# Patient Record
Sex: FEMALE | Race: WHITE
[De-identification: names, ages, dates, MRNs, and addresses within clinical notes are randomized per-mention and may not be internally consistent; named-entity substitution may affect disease eponyms.]

---

## 2017-09-25 ENCOUNTER — HOSPITAL ENCOUNTER (EMERGENCY)
Dept: HOSPITAL 1 - ED | Age: 57
Discharge: HOME | End: 2017-09-25
Payer: MEDICAID

## 2017-09-25 VITALS — DIASTOLIC BLOOD PRESSURE: 77 MMHG | SYSTOLIC BLOOD PRESSURE: 158 MMHG

## 2017-09-25 DIAGNOSIS — Z79.82: ICD-10-CM

## 2017-09-25 DIAGNOSIS — M75.92: Primary | ICD-10-CM

## 2017-09-25 DIAGNOSIS — E78.00: ICD-10-CM

## 2017-09-25 DIAGNOSIS — Z79.1: ICD-10-CM

## 2017-12-20 ENCOUNTER — HOSPITAL ENCOUNTER (EMERGENCY)
Dept: HOSPITAL 1 - ED | Age: 57
LOS: 1 days | Discharge: HOME | End: 2017-12-21
Payer: MEDICAID

## 2017-12-20 VITALS — BODY MASS INDEX: 35.51 KG/M2 | HEIGHT: 62 IN | WEIGHT: 192.99 LBS

## 2017-12-20 DIAGNOSIS — E78.00: ICD-10-CM

## 2017-12-20 DIAGNOSIS — E03.9: ICD-10-CM

## 2017-12-20 DIAGNOSIS — K80.20: Primary | ICD-10-CM

## 2017-12-21 VITALS — DIASTOLIC BLOOD PRESSURE: 75 MMHG | SYSTOLIC BLOOD PRESSURE: 123 MMHG

## 2017-12-21 LAB
ALBUMIN SERPL-MCNC: 3.2 G/DL (ref 3.4–5)
ALP SERPL-CCNC: 87 U/L (ref 46–116)
ALT SERPL-CCNC: 20 U/L (ref 14–59)
AST SERPL-CCNC: 14 U/L (ref 15–37)
BASOPHILS NFR BLD: 0.9 % (ref 0–2)
BILIRUB SERPL-MCNC: 0.17 MG/DL (ref 0.2–1)
BUN SERPL-MCNC: 14 MG/DL (ref 7–18)
CALCIUM SERPL-MCNC: 8.9 MG/DL (ref 8.5–10.1)
CHLORIDE SERPL-SCNC: 103 MMOL/L (ref 98–107)
CO2 SERPL-SCNC: 30.4 MMOL/L (ref 21–32)
CREAT SERPL-MCNC: 0.7 MG/DL (ref 0.6–1)
ERYTHROCYTE [DISTWIDTH] IN BLOOD BY AUTOMATED COUNT: 12.9 % (ref 11.5–14.5)
GFR SERPLBLD BASED ON 1.73 SQ M-ARVRAT: > 60 ML/MIN
GLUCOSE SERPL-MCNC: 121 MG/DL (ref 74–106)
LIPASE SERPL-CCNC: 219 IU/L (ref 73–393)
MICROSCOPIC UR-IMP: YES
PLATELET # BLD: 311 X10^3MCL (ref 130–400)
POTASSIUM SERPL-SCNC: 3.6 MMOL/L (ref 3.5–5.1)
PROT SERPL-MCNC: 7.8 G/DL (ref 6.4–8.2)
RBC # UR STRIP.AUTO: (no result) /UL
SODIUM SERPL-SCNC: 139 MMOL/L (ref 136–145)
T4 FREE SERPL-MCNC: 1.04 NG/DL (ref 0.76–1.46)
UA SPECIFIC GRAVITY: 1.01 (ref 1–1.03)

## 2018-11-30 ENCOUNTER — HOSPITAL ENCOUNTER (EMERGENCY)
Dept: HOSPITAL 1 - ED | Age: 58
Discharge: HOME | End: 2018-11-30
Payer: MEDICAID

## 2018-11-30 VITALS — SYSTOLIC BLOOD PRESSURE: 143 MMHG | DIASTOLIC BLOOD PRESSURE: 67 MMHG

## 2018-11-30 VITALS — HEIGHT: 63 IN | WEIGHT: 182 LBS | BODY MASS INDEX: 32.25 KG/M2

## 2018-11-30 DIAGNOSIS — F41.9: ICD-10-CM

## 2018-11-30 DIAGNOSIS — R51: Primary | ICD-10-CM

## 2018-11-30 DIAGNOSIS — I10: ICD-10-CM

## 2018-11-30 DIAGNOSIS — E78.00: ICD-10-CM

## 2018-11-30 DIAGNOSIS — R42: ICD-10-CM

## 2018-11-30 DIAGNOSIS — R11.0: ICD-10-CM

## 2018-11-30 LAB
BASOPHILS NFR BLD: 0.4 % (ref 0–2)
BUN SERPL-MCNC: 9 MG/DL (ref 7–18)
CALCIUM SERPL-MCNC: 8.9 MG/DL (ref 8.5–10.1)
CHLORIDE SERPL-SCNC: 103 MMOL/L (ref 98–107)
CO2 SERPL-SCNC: 26.5 MMOL/L (ref 21–32)
CREAT SERPL-MCNC: 0.7 MG/DL (ref 0.6–1)
ERYTHROCYTE [DISTWIDTH] IN BLOOD BY AUTOMATED COUNT: 14.7 % (ref 11.5–14.5)
GFR SERPLBLD BASED ON 1.73 SQ M-ARVRAT: > 60 ML/MIN
GLUCOSE SERPL-MCNC: 94 MG/DL (ref 74–106)
PLATELET # BLD: 314 X10^3MCL (ref 130–400)
POTASSIUM SERPL-SCNC: 3.8 MMOL/L (ref 3.5–5.1)
SODIUM SERPL-SCNC: 138 MMOL/L (ref 136–145)

## 2019-02-19 ENCOUNTER — HOSPITAL ENCOUNTER (INPATIENT)
Dept: HOSPITAL 1 - ED | Age: 59
LOS: 3 days | Discharge: HOME | DRG: 263 | End: 2019-02-22
Attending: HOSPITALIST | Admitting: HOSPITALIST
Payer: MEDICAID

## 2019-02-19 VITALS — WEIGHT: 185 LBS | BODY MASS INDEX: 34.04 KG/M2 | HEIGHT: 62 IN

## 2019-02-19 DIAGNOSIS — E03.9: ICD-10-CM

## 2019-02-19 DIAGNOSIS — Z87.891: ICD-10-CM

## 2019-02-19 DIAGNOSIS — E78.5: ICD-10-CM

## 2019-02-19 DIAGNOSIS — R31.9: ICD-10-CM

## 2019-02-19 DIAGNOSIS — Z79.82: ICD-10-CM

## 2019-02-19 DIAGNOSIS — K80.00: Primary | ICD-10-CM

## 2019-02-19 LAB
ALBUMIN SERPL-MCNC: 3.5 G/DL (ref 3.4–5)
ALP SERPL-CCNC: 90 U/L (ref 46–116)
ALT SERPL-CCNC: 21 U/L (ref 14–59)
AST SERPL-CCNC: 17 U/L (ref 15–37)
BASOPHILS NFR BLD: 0.2 % (ref 0–2)
BILIRUB SERPL-MCNC: 0.1 MG/DL (ref 0.2–1)
BUN SERPL-MCNC: 15 MG/DL (ref 7–18)
CALCIUM SERPL-MCNC: 8.8 MG/DL (ref 8.5–10.1)
CHLORIDE SERPL-SCNC: 103 MMOL/L (ref 98–107)
CO2 SERPL-SCNC: 30.3 MMOL/L (ref 21–32)
CREAT SERPL-MCNC: 1 MG/DL (ref 0.6–1)
ERYTHROCYTE [DISTWIDTH] IN BLOOD BY AUTOMATED COUNT: 14.4 % (ref 11.5–14.5)
GFR SERPLBLD BASED ON 1.73 SQ M-ARVRAT: > 60 ML/MIN
GLUCOSE SERPL-MCNC: 107 MG/DL (ref 74–106)
LIPASE SERPL-CCNC: 216 IU/L (ref 73–393)
PLATELET # BLD: 293 X10^3MCL (ref 130–400)
POTASSIUM SERPL-SCNC: 3.6 MMOL/L (ref 3.5–5.1)
PROT SERPL-MCNC: 8.2 G/DL (ref 6.4–8.2)
SODIUM SERPL-SCNC: 141 MMOL/L (ref 136–145)

## 2019-02-19 PROCEDURE — C9113 INJ PANTOPRAZOLE SODIUM, VIA: HCPCS

## 2019-02-20 VITALS — DIASTOLIC BLOOD PRESSURE: 80 MMHG | SYSTOLIC BLOOD PRESSURE: 159 MMHG

## 2019-02-20 VITALS — SYSTOLIC BLOOD PRESSURE: 149 MMHG | DIASTOLIC BLOOD PRESSURE: 76 MMHG

## 2019-02-20 VITALS — SYSTOLIC BLOOD PRESSURE: 151 MMHG | DIASTOLIC BLOOD PRESSURE: 79 MMHG

## 2019-02-20 VITALS — DIASTOLIC BLOOD PRESSURE: 78 MMHG | SYSTOLIC BLOOD PRESSURE: 149 MMHG

## 2019-02-20 VITALS — DIASTOLIC BLOOD PRESSURE: 70 MMHG | SYSTOLIC BLOOD PRESSURE: 128 MMHG

## 2019-02-20 LAB
AMPHETAMINES UR QL SCN: (no result)
BASOPHILS NFR BLD: 0.2 % (ref 0–2)
BUN SERPL-MCNC: 10 MG/DL (ref 7–18)
CALCIUM SERPL-MCNC: 8.3 MG/DL (ref 8.5–10.1)
CHLORIDE SERPL-SCNC: 104 MMOL/L (ref 98–107)
CHOLEST SERPL-MCNC: 200 MG/DL (ref ?–200)
CHOLEST/HDLC SERPL: 4.5 MG/DL
CO2 SERPL-SCNC: 25.7 MMOL/L (ref 21–32)
CREAT SERPL-MCNC: 0.7 MG/DL (ref 0.6–1)
ERYTHROCYTE [DISTWIDTH] IN BLOOD BY AUTOMATED COUNT: 13.9 % (ref 11.5–14.5)
GFR SERPLBLD BASED ON 1.73 SQ M-ARVRAT: > 60 ML/MIN
GLUCOSE SERPL-MCNC: 126 MG/DL (ref 74–106)
HDLC SERPL-MCNC: 44 MG/DL (ref 40–60)
MAGNESIUM SERPL-MCNC: 2.3 MG/DL (ref 1.8–2.4)
MICROSCOPIC UR-IMP: YES
PHOSPHATE SERPL-MCNC: 3.5 MG/DL (ref 2.5–4.9)
PLATELET # BLD: 277 X10^3MCL (ref 130–400)
POTASSIUM SERPL-SCNC: 4 MMOL/L (ref 3.5–5.1)
RBC # UR STRIP.AUTO: (no result) /UL
SODIUM SERPL-SCNC: 139 MMOL/L (ref 136–145)
T3 SERPL-MCNC: 1.19 NG/ML
T3RU NFR SERPL: 34 % UPTAKE (ref 30–39)
T4 FREE SERPL-MCNC: 1.07 NG/DL (ref 0.76–1.46)
T4 SERPL-MCNC: 8.1 UG/DL (ref 4.7–13.3)
T4/T3 UPTAKE INDEX SERPL: 2.8 UG/DL (ref 1.4–4.5)
TRIGL SERPL-MCNC: 106 MG/DL (ref ?–150)
UA SPECIFIC GRAVITY: >=1.03 (ref 1–1.03)

## 2019-02-20 NOTE — NUR
PT VOMIT X1, SMALL AMT IN TRASH CAN. PT STATES FEELING RELIEF. DENIES ANY
NAUSEA AT THIS TIME. EMESIS BAG PROVIDED. INSTRUCTED TO CALL FOR ANY FURTHER
NAUSEA SYMPTOMS.

## 2019-02-20 NOTE — NUR
PT TRANSFERRED FROM ER VIA [facility] ACCOMPANIED BY RN AND EMT. PT IS A/O X4,
SPEECH CLEAR AND APPROPRIATE. PERRLA NOTED. PT WITH C/O 8/10 EPIGASTRIC PAIN.
CARDIAC MONITOR IN PLACE, TELE MONITOR #37 SH[address] NO SOB NOTED, RESPS E/U
ON ROOM AIR. LUNG SOUNDS CTA. CHEST RISE EQUAL AND SYMMETRICAL. ABD ROUNDED,
SOFT, NON-DISTENDED. C/O MID TO RIGHT SIDED ABD PAIN UPON PALPATION. BOWEL
SOUNDS HYPOACTIVE. DENIES ANY N/V/D. SKIN WARM DRY TO TOUCH. IV TO LFA G 22
INTACT AND PATENT. NS INFUSING @ 100ML/HR. PT VOIDS FREELY WITH BRP.
AMBULATORY WITH STEADY GAIT. INSTRUCTED TO CALL FOR ASSISTANCE. CALL LIGHT
WITHIN REACH. WILL CONTINUE TO MONITOR.

## 2019-02-20 NOTE — NUR
PT RESTING IN BED, FAMILY AT BEDSIDE. AOX4, DENIES HA/DIZZINESS. TELE #37, ST
DENIES CP. PULSES PALPABLE BILAT, DENIES NUMBNESS/TINGLING IN FEET. RESP EVEN
AND UNLABORED ON RA, DENIES SOB. ABD SOFT, ROUND, REPORTS PAIN W/ PALPATION
ONLY TO RUQ. PT ABLE TO EAT SMALL PORTION OF DINNER (FRUIT CUP AND BREAD) AND
PT UNDERSTANDS SHE WILL BE NPO AFTER MIDNIGHT FOR LAP LUISA TOMORROW 2/21/19
@0930. WILL OBTAIN CONSENT. DENIES N/V/D AT THIS TIME. PT AMB DENIES DIZZINESS
UPON STANDING. SKIN INTACT. IV SITE TO LFA NS @100ML/HR. NO REDNESS, SWELLING
OR PAIN NOTED. ALL COMFORT AND SAFETY MEASURES PROVIDED FOR, CALL LIGHT
WITHIN REACH, BED IN LOWEST POSITION, WILL CONTINUE TO MONITOR.

## 2019-02-20 NOTE — NUR
PT MEDICATED WITH MORPHINE IVP AND MYLANTA FOR C/O 8/10 EPIGASTRIC PAIN AND
HEARTBURN. WILL REASSESS PER PROTOCOL.

## 2019-02-20 NOTE — NUR
PT WITH C/O HEARTBURN REQUESTING MEDICATION. DR HERNANDEZ CALLED AND MADE
AWARE, ORDER GIVEN FOR MYLANTA PRN.

## 2019-02-21 VITALS — DIASTOLIC BLOOD PRESSURE: 65 MMHG | SYSTOLIC BLOOD PRESSURE: 109 MMHG

## 2019-02-21 VITALS — SYSTOLIC BLOOD PRESSURE: 95 MMHG | DIASTOLIC BLOOD PRESSURE: 57 MMHG

## 2019-02-21 VITALS — DIASTOLIC BLOOD PRESSURE: 70 MMHG | SYSTOLIC BLOOD PRESSURE: 127 MMHG

## 2019-02-21 VITALS — DIASTOLIC BLOOD PRESSURE: 59 MMHG | SYSTOLIC BLOOD PRESSURE: 125 MMHG

## 2019-02-21 VITALS — SYSTOLIC BLOOD PRESSURE: 92 MMHG | DIASTOLIC BLOOD PRESSURE: 47 MMHG

## 2019-02-21 VITALS — DIASTOLIC BLOOD PRESSURE: 62 MMHG | SYSTOLIC BLOOD PRESSURE: 136 MMHG

## 2019-02-21 LAB
BASOPHILS NFR BLD: 0.3 % (ref 0–2)
BUN SERPL-MCNC: 8 MG/DL (ref 7–18)
CALCIUM SERPL-MCNC: 8.9 MG/DL (ref 8.5–10.1)
CHLORIDE SERPL-SCNC: 105 MMOL/L (ref 98–107)
CO2 SERPL-SCNC: 27.4 MMOL/L (ref 21–32)
CREAT SERPL-MCNC: 0.7 MG/DL (ref 0.6–1)
ERYTHROCYTE [DISTWIDTH] IN BLOOD BY AUTOMATED COUNT: 14.3 % (ref 11.5–14.5)
GFR SERPLBLD BASED ON 1.73 SQ M-ARVRAT: > 60 ML/MIN
GLUCOSE SERPL-MCNC: 104 MG/DL (ref 74–106)
MAGNESIUM SERPL-MCNC: 2.3 MG/DL (ref 1.8–2.4)
PHOSPHATE SERPL-MCNC: 3.4 MG/DL (ref 2.5–4.9)
PLATELET # BLD: 291 X10^3MCL (ref 130–400)
POTASSIUM SERPL-SCNC: 3.6 MMOL/L (ref 3.5–5.1)
SODIUM SERPL-SCNC: 137 MMOL/L (ref 136–145)

## 2019-02-21 PROCEDURE — 0FT44ZZ RESECTION OF GALLBLADDER, PERCUTANEOUS ENDOSCOPIC APPROACH: ICD-10-PCS | Performed by: SURGERY

## 2019-02-21 NOTE — NUR
RECEIVED PATIENT BACK FROM OR S/P LAP LUISA, MONITOR TECH MADE AWARE. PATIENT
A/O X4, CLEAR SPEECH, ABLE TO MAKE NEEDS KNOWN AND FOLLOW COMMANDS, ABLE TO
TRANSFER SELF FROM GURNEY TO BED. PATIENT PLACED ON O2 4 L/MIN VIA NC,
BREATHING EVEN UNLABBORED. PATIENT HAS X5 INCISION TO ABD, X4 CLOSED WITH
SUTURES AND DERMABOND, X1 CLOSED WITH STAPLES AND DERMABOND, CDI KATERYNA. PATIENT
DENIES ANY PAIN. PATIENT MADE COMFORTABLE, ALL NEEDS ATTENDED TO, SAFETY
PRECAUTIONS IN PLACE. FAMILY AT BEDSIDE.
VS: 95/57, MAP 71, HR 83, TEMP 98.3, O2 SAT 97%.

## 2019-02-21 NOTE — NUR
RECEIVED PATIENT SITTING UP IN BED A/O X4, CLEAR SPEECH, NO NEURO DEFICITS
NOTED. TELE # 37 IN PLACE, DENIES CHEST PAIN. BREATHING EVEN UNLABBORED ON RA,
DENIES SOB, NO DISTRESS NOTED. DENIES ANY PAIN. NPO STATUS MAINTAINED FOR
PROCEDURE THIS AM. IV TO LFA INTACT AND PATENT FLUSHED WELL WITH 10 ML NS.
PATIENT IS CALM WITH CARE. INSTRUCTED TO CALL FOR ASSISTANCE IF NEEDED.
SAFETY PRECAUTIONS IN PLACE. WILL MONITOR.

## 2019-02-21 NOTE — NUR
PROVIDED PT WIPES TO PERFORM SELF LEON WIPE DOWN. PT TOLERATED WELL. DENIES
PAIN AT THIS TIME. WILL ENDORSED CHECKLIST.

## 2019-02-21 NOTE — NUR
PATIENT SITTING UP IN BED EATING DINNER, TOLERATING WELL NO DISTRESS NOTED.
FAMILY AT BEDSIDE. IV TO LFA INTACT INFUSING IVF WELL FREE FROM REDNESS AND
INFILTRATION. INCISION X5 TO ABD CDI. PATIENT STATES SHE IS BURPING, BUT
DENIES PASSING GAS, NO BM YET. ALL NEEDS ATTENDED TO DURING SHIFT. SAFETY
PRECAUTIONS IN PLACE. WILL ENDORSE CARE TO ONCOMING NURSE.

## 2019-02-21 NOTE — NUR
PT RESTING IN BED, FAMILY AT BEDSIDE. PT S/P LAP LUISA (2/21/19) WITH 5 ABD
LAP INCISIONS (CLOSED WITH SUTURES AND DERMABOND X4, CLOSED WITH STAPLE AND
DERMABOND X1. INCISIONS APPEAR WELL APPROXIMATED, NO DRAIANGE NOTED.
HYPOACTIVE SOUND SOUNDS, PT REPORT BURPING BUT NOT YET PASSING GAS. PT HAS NO
BM YET. PT HAS BEEN UP WITH PT SINCE SX, TOLERATING WELL. ENCOURAGED PT TO
AMBULATE MORE DURING SHIFT/ PT VERBALIZES UNDERSTANDING. TELE #37, ,
DENIES CP. RESP EVEN AND UNLABORED ON 2LNC, DENIES SOB. IV SITE TO LFA D5 1/2
NS AT 80ML/HR. NO REDNESS, SWELLING OR PAIN NOTED. ALL COMFORT AND SAFETY
MEASURES PROVIDED FOR, CALL LIGHT WITHIN REACH,BED IN LOWEST POSITION, WILL
CONTINUE TO MONITOR.

## 2019-02-21 NOTE — NUR
PT RESTED IN INTERVALS DURING SHIFT, NO ACUTE CHANGES OCCURRING OVERNIGHT. PT
DENIES ABD PAIN DURING SHIFT, DENIES N/V/D. PT SIGNED CONSENT FOR LAP LUISA W/
POSS OPEN. IV SITE REMAINS PATENT TO LFA, NS @ 100ML/HR. NO REDNESS, SWELLING
OR PAIN NOTED. PT HAS BEEN NPO SINCE MIDNIGHT, ALL COMFORT AND SAFETY MEASURES
PROVIDED FOR, CALL LIGHT WITHIN REACH, BED IN LOWEST POSITION, WILL CONTINUE
TO MONITOR.

## 2019-02-21 NOTE — NUR
ALL CARE ENDORSED TO DAYSHIFT NURSE, NO ACUTE DISTRESS NOTED. ALL QUESTIONS
AND CONCERNS ADDRESSED, CALL LIGHT WITHIN REACH,  BED IN LOWEST POSITION.

## 2019-02-22 VITALS — DIASTOLIC BLOOD PRESSURE: 54 MMHG | SYSTOLIC BLOOD PRESSURE: 114 MMHG

## 2019-02-22 VITALS — DIASTOLIC BLOOD PRESSURE: 56 MMHG | SYSTOLIC BLOOD PRESSURE: 98 MMHG

## 2019-02-22 VITALS — SYSTOLIC BLOOD PRESSURE: 114 MMHG | DIASTOLIC BLOOD PRESSURE: 54 MMHG

## 2019-02-22 LAB
BASOPHILS NFR BLD: 0.1 % (ref 0–2)
BUN SERPL-MCNC: 10 MG/DL (ref 7–18)
CALCIUM SERPL-MCNC: 8.5 MG/DL (ref 8.5–10.1)
CHLORIDE SERPL-SCNC: 104 MMOL/L (ref 98–107)
CO2 SERPL-SCNC: 29.5 MMOL/L (ref 21–32)
CREAT SERPL-MCNC: 0.8 MG/DL (ref 0.6–1)
ERYTHROCYTE [DISTWIDTH] IN BLOOD BY AUTOMATED COUNT: 14.3 % (ref 11.5–14.5)
GFR SERPLBLD BASED ON 1.73 SQ M-ARVRAT: > 60 ML/MIN
GLUCOSE SERPL-MCNC: 122 MG/DL (ref 74–106)
PLATELET # BLD: 249 X10^3MCL (ref 130–400)
POTASSIUM SERPL-SCNC: 3.9 MMOL/L (ref 3.5–5.1)
SODIUM SERPL-SCNC: 139 MMOL/L (ref 136–145)

## 2019-02-22 NOTE — NUR
UPON ASSESSMENT ON PT, PT REPORTS PAIN UNDER CONTROL AT THIS TIME. PT REPORTS
AMBULATING TO RESTROOM TO URINATE. REPORTS NO PAIN WHEN GETTING UP. ATTEMPTED
TO GET PT OFF 2LNC, PT O2 SAT ON RA= 90-93%. RR= 22. RETURNED PT BACK TO NASAL
[facility]ULA AND ASKED PT TO DEMONSTRATE THE USE OF THE INCENTIVE SPIROMETER. PT
ABLE TO REACH 1000ML, WILL ENCOURAGE IT'S USE 10X HOUR WHILE AWAKE TO PROMOTE
GAS EXCHANGE AND FULLY LUNG EXPANSION. PT VERBALIZES UNDERSTANDING, ALL
COMFORT AND SAFETY MEASURES PROVIDED FOR, CALL LIGHT WITHIN REACH, BED IN
LOWEST POSITION, WILL CONTINUE TO MONITOR.

## 2019-02-22 NOTE — NUR
REPORT RECEIVED KRISTIE EVANGELISTA. PATIENT IN BED RESTING, BED IS TO THE LOWEST
POSITION. PATIENT IS ALERT AND ORIENTED, ABLE TO FOLLOW COMMANDS, AND
PLEASANT. PATIENT IS CURRENTLY EATING AND NO TROUBLE SWALLOWING.
PATIENT IS BREATHING ADEQUATELY WITH NO SIGNS OF DISTRESS.
PATIENTS ABDOMEN INCISION IS DRY AND INTACT AND NO SIGNS OF DRAINAGE.IT IS
CLOSED WITH SUTURES AND DERMABAND AND CLOSED. WILL CONTINUE TO MONITOR.

## 2019-02-22 NOTE — NUR
TELE RETURNED TO MONITOR TECH AT THIS TIME. PT WHEELED OFF UNIT WITH CNA AND
DAUGHTER. NO INCIDENT OCCURED.

## 2019-02-22 NOTE — NUR
DISCHARGE INSTRUCTIONS PROVIDED TO PATIENT AT THIS TIME. ALL BELONGINGS
RETURNED TO PATIENT, GLASSES, DENTURES, EARRINGS, PHONE,  ETC. DAUGHTER
AT BEDSIDE AND WILL BE TAKING PATIENT HOME. IV DC'D AT THIS TIME CATH INTACT,
DRESSING IN PLACE. WILL WHEEL PATIENT OUT TO CAR.

## 2019-02-22 NOTE — NUR
PT RESTED IN INTERVALS DURING SHIFT, NO ACUTE CHNAGES OCCURRING OVERNIGHT. PT
DENIES N/V/D DURING SHIFT. PT REPORTS PASSING GAS YET NO BM YET. ENCOURAGED
AMBULATION DURING SHIFT, YET PT REPORTS SHE WANTS TO WALK DURING THE DAYTIME
AND GET REST AT NIGHT. EDUCATED PT IN REGARDS TO BENEFITS OF EARLY
AMBULATION. PT HAS INCENTIVE SPIROMETER AT BEDSIDE. ABD INCISIONS REMAINS FREE
OF DRAINAGE, AND ARE WELL APPROXIMATED. PT MEDICATED X1 WITH NORCO FOR ABD
PAIN AND PT RESTED WELL AFTER ADMINISTERED. NEW IV INSERTED TO LFA 20G, D5 1/2
NS @ 80ML/HR, NO REDNESS, SWELLING OR PAIN NOTED. ALL COMFORT AND SAFETY
MEASURES PROVIDED FOR, CALL LIGHT WITHIN REACH, BED IN LOWEST POSITION, WILL
CONTINUE TO MONITOR.

## 2019-03-12 ENCOUNTER — HOSPITAL ENCOUNTER (EMERGENCY)
Dept: HOSPITAL 1 - ED | Age: 59
Discharge: HOME | End: 2019-03-12
Payer: MEDICAID

## 2019-03-12 VITALS — DIASTOLIC BLOOD PRESSURE: 73 MMHG | SYSTOLIC BLOOD PRESSURE: 143 MMHG

## 2019-03-12 VITALS — WEIGHT: 181.13 LBS | BODY MASS INDEX: 33.33 KG/M2 | HEIGHT: 62 IN

## 2019-03-12 DIAGNOSIS — E78.00: ICD-10-CM

## 2019-03-12 DIAGNOSIS — M26.602: Primary | ICD-10-CM

## 2019-03-12 DIAGNOSIS — E03.9: ICD-10-CM

## 2019-03-12 DIAGNOSIS — F41.9: ICD-10-CM

## 2019-03-12 DIAGNOSIS — Z90.49: ICD-10-CM

## 2019-10-26 ENCOUNTER — HOSPITAL ENCOUNTER (INPATIENT)
Dept: HOSPITAL 1 - ED | Age: 59
LOS: 1 days | Discharge: HOME | DRG: 203 | End: 2019-10-27
Attending: FAMILY MEDICINE | Admitting: FAMILY MEDICINE
Payer: MEDICAID

## 2019-10-26 VITALS
BODY MASS INDEX: 30.73 KG/M2 | HEIGHT: 62 IN | HEIGHT: 62 IN | WEIGHT: 167 LBS | WEIGHT: 167 LBS | BODY MASS INDEX: 30.73 KG/M2

## 2019-10-26 VITALS — SYSTOLIC BLOOD PRESSURE: 107 MMHG | DIASTOLIC BLOOD PRESSURE: 63 MMHG

## 2019-10-26 VITALS — SYSTOLIC BLOOD PRESSURE: 141 MMHG | DIASTOLIC BLOOD PRESSURE: 84 MMHG

## 2019-10-26 DIAGNOSIS — R07.89: Primary | ICD-10-CM

## 2019-10-26 DIAGNOSIS — E78.5: ICD-10-CM

## 2019-10-26 DIAGNOSIS — I10: ICD-10-CM

## 2019-10-26 DIAGNOSIS — E03.9: ICD-10-CM

## 2019-10-26 LAB
ALBUMIN SERPL-MCNC: 3.5 G/DL (ref 3.4–5)
ALP SERPL-CCNC: 89 U/L (ref 46–116)
ALT SERPL-CCNC: 19 U/L (ref 14–59)
AST SERPL-CCNC: 25 U/L (ref 15–37)
BASOPHILS NFR BLD: 0.3 % (ref 0–2)
BILIRUB SERPL-MCNC: 0.39 MG/DL (ref 0.2–1)
BUN SERPL-MCNC: 7 MG/DL (ref 7–18)
CALCIUM SERPL-MCNC: 8.4 MG/DL (ref 8.5–10.1)
CHLORIDE SERPL-SCNC: 104 MMOL/L (ref 98–107)
CHOLEST SERPL-MCNC: 166 MG/DL (ref ?–200)
CHOLEST/HDLC SERPL: 3.1 MG/DL
CO2 SERPL-SCNC: 27.5 MMOL/L (ref 21–32)
CREAT SERPL-MCNC: 0.5 MG/DL (ref 0.6–1)
ERYTHROCYTE [DISTWIDTH] IN BLOOD BY AUTOMATED COUNT: 15 % (ref 11.5–14.5)
GFR SERPLBLD BASED ON 1.73 SQ M-ARVRAT: > 60 ML/MIN
GLUCOSE SERPL-MCNC: 97 MG/DL (ref 74–106)
HDLC SERPL-MCNC: 54 MG/DL (ref 40–60)
PLATELET # BLD: 265 X10^3MCL (ref 130–400)
POTASSIUM SERPL-SCNC: 3.4 MMOL/L (ref 3.5–5.1)
PROT SERPL-MCNC: 8 G/DL (ref 6.4–8.2)
SODIUM SERPL-SCNC: 140 MMOL/L (ref 136–145)
T3 SERPL-MCNC: 1.18 NG/ML
T3RU NFR SERPL: 34 % UPTAKE (ref 30–39)
T4 FREE SERPL-MCNC: 1.17 NG/DL (ref 0.76–1.46)
T4 SERPL-MCNC: 11 UG/DL (ref 4.7–13.3)
T4/T3 UPTAKE INDEX SERPL: 3.7 UG/DL (ref 1.4–4.5)
TRIGL SERPL-MCNC: 109 MG/DL (ref ?–150)

## 2019-10-26 PROCEDURE — G0378 HOSPITAL OBSERVATION PER HR: HCPCS

## 2019-10-27 VITALS — SYSTOLIC BLOOD PRESSURE: 111 MMHG | DIASTOLIC BLOOD PRESSURE: 61 MMHG

## 2019-10-27 VITALS — SYSTOLIC BLOOD PRESSURE: 104 MMHG | DIASTOLIC BLOOD PRESSURE: 56 MMHG

## 2019-10-27 VITALS — SYSTOLIC BLOOD PRESSURE: 138 MMHG | DIASTOLIC BLOOD PRESSURE: 76 MMHG

## 2019-10-27 VITALS — SYSTOLIC BLOOD PRESSURE: 95 MMHG | DIASTOLIC BLOOD PRESSURE: 55 MMHG

## 2019-10-27 LAB
BASOPHILS NFR BLD: 0.5 % (ref 0–2)
BUN SERPL-MCNC: 8 MG/DL (ref 7–18)
CALCIUM SERPL-MCNC: 8.5 MG/DL (ref 8.5–10.1)
CHLORIDE SERPL-SCNC: 106 MMOL/L (ref 98–107)
CO2 SERPL-SCNC: 27.8 MMOL/L (ref 21–32)
CREAT SERPL-MCNC: 0.6 MG/DL (ref 0.6–1)
ERYTHROCYTE [DISTWIDTH] IN BLOOD BY AUTOMATED COUNT: 14.8 % (ref 11.5–14.5)
GFR SERPLBLD BASED ON 1.73 SQ M-ARVRAT: > 60 ML/MIN
GLUCOSE SERPL-MCNC: 91 MG/DL (ref 74–106)
MAGNESIUM SERPL-MCNC: 2.1 MG/DL (ref 1.8–2.4)
PHOSPHATE SERPL-MCNC: 3.5 MG/DL (ref 2.5–4.9)
PLATELET # BLD: 260 X10^3MCL (ref 130–400)
POTASSIUM SERPL-SCNC: 4.4 MMOL/L (ref 3.5–5.1)
SODIUM SERPL-SCNC: 141 MMOL/L (ref 136–145)

## 2020-05-30 ENCOUNTER — HOSPITAL ENCOUNTER (EMERGENCY)
Dept: HOSPITAL 1 - ED | Age: 60
Discharge: HOME | End: 2020-05-30
Payer: MEDICAID

## 2020-05-30 VITALS
WEIGHT: 171 LBS | BODY MASS INDEX: 31.47 KG/M2 | HEIGHT: 62 IN | WEIGHT: 171 LBS | BODY MASS INDEX: 31.47 KG/M2 | HEIGHT: 62 IN

## 2020-05-30 VITALS — SYSTOLIC BLOOD PRESSURE: 149 MMHG | DIASTOLIC BLOOD PRESSURE: 80 MMHG

## 2020-05-30 DIAGNOSIS — I10: ICD-10-CM

## 2020-05-30 DIAGNOSIS — R07.89: ICD-10-CM

## 2020-05-30 DIAGNOSIS — E78.00: ICD-10-CM

## 2020-05-30 DIAGNOSIS — M54.6: Primary | ICD-10-CM

## 2020-05-30 LAB
ALBUMIN SERPL-MCNC: 3.9 G/DL (ref 3.4–5)
ALP SERPL-CCNC: 92 U/L (ref 46–116)
ALT SERPL-CCNC: 22 U/L (ref 14–59)
AST SERPL-CCNC: 17 U/L (ref 15–37)
BASOPHILS NFR BLD: 0.5 % (ref 0–2)
BILIRUB SERPL-MCNC: 0.4 MG/DL (ref 0.2–1)
BUN SERPL-MCNC: 13 MG/DL (ref 7–18)
CALCIUM SERPL-MCNC: 9.1 MG/DL (ref 8.5–10.1)
CHLORIDE SERPL-SCNC: 104 MMOL/L (ref 98–107)
CO2 SERPL-SCNC: 28.2 MMOL/L (ref 21–32)
CREAT SERPL-MCNC: 0.9 MG/DL (ref 0.6–1)
ERYTHROCYTE [DISTWIDTH] IN BLOOD BY AUTOMATED COUNT: 13.9 % (ref 11.5–14.5)
GFR SERPLBLD BASED ON 1.73 SQ M-ARVRAT: > 60 ML/MIN
GLUCOSE SERPL-MCNC: 113 MG/DL (ref 74–106)
PLATELET # BLD: 274 X10^3MCL (ref 130–400)
POTASSIUM SERPL-SCNC: 4.2 MMOL/L (ref 3.5–5.1)
PROT SERPL-MCNC: 8.3 G/DL (ref 6.4–8.2)
SODIUM SERPL-SCNC: 143 MMOL/L (ref 136–145)